# Patient Record
Sex: FEMALE | Race: WHITE
[De-identification: names, ages, dates, MRNs, and addresses within clinical notes are randomized per-mention and may not be internally consistent; named-entity substitution may affect disease eponyms.]

---

## 2022-03-14 ENCOUNTER — RESULT REVIEW (OUTPATIENT)
Age: 62
End: 2022-03-14

## 2022-06-16 PROBLEM — Z00.00 ENCOUNTER FOR PREVENTIVE HEALTH EXAMINATION: Status: ACTIVE | Noted: 2022-06-16

## 2022-06-27 ENCOUNTER — APPOINTMENT (OUTPATIENT)
Dept: PULMONOLOGY | Facility: CLINIC | Age: 62
End: 2022-06-27
Payer: COMMERCIAL

## 2022-06-27 VITALS
OXYGEN SATURATION: 99 % | DIASTOLIC BLOOD PRESSURE: 70 MMHG | HEART RATE: 68 BPM | RESPIRATION RATE: 16 BRPM | SYSTOLIC BLOOD PRESSURE: 120 MMHG

## 2022-06-27 VITALS — BODY MASS INDEX: 25.52 KG/M2 | WEIGHT: 130 LBS | HEIGHT: 60 IN

## 2022-06-27 DIAGNOSIS — I25.10 ATHEROSCLEROTIC HEART DISEASE OF NATIVE CORONARY ARTERY W/OUT ANGINA PECTORIS: ICD-10-CM

## 2022-06-27 DIAGNOSIS — F17.200 NICOTINE DEPENDENCE, UNSPECIFIED, UNCOMPLICATED: ICD-10-CM

## 2022-06-27 DIAGNOSIS — I25.84 ATHEROSCLEROTIC HEART DISEASE OF NATIVE CORONARY ARTERY W/OUT ANGINA PECTORIS: ICD-10-CM

## 2022-06-27 DIAGNOSIS — R91.1 SOLITARY PULMONARY NODULE: ICD-10-CM

## 2022-06-27 PROCEDURE — 99204 OFFICE O/P NEW MOD 45 MIN: CPT

## 2022-06-27 RX ORDER — LOSARTAN POTASSIUM 50 MG/1
50 TABLET, FILM COATED ORAL
Qty: 30 | Refills: 0 | Status: ACTIVE | COMMUNITY
Start: 2022-05-24

## 2022-06-27 RX ORDER — ROSUVASTATIN CALCIUM 20 MG/1
20 TABLET, FILM COATED ORAL
Qty: 30 | Refills: 0 | Status: ACTIVE | COMMUNITY
Start: 2022-05-24

## 2022-06-27 RX ORDER — FLUOXETINE HYDROCHLORIDE 20 MG/1
20 CAPSULE ORAL
Qty: 90 | Refills: 0 | Status: ACTIVE | COMMUNITY
Start: 2021-10-23

## 2022-06-27 RX ORDER — CLOBETASOL PROPIONATE 0.5 MG/G
0.05 CREAM TOPICAL
Qty: 30 | Refills: 0 | Status: ACTIVE | COMMUNITY
Start: 2022-06-21

## 2022-06-27 RX ORDER — TRIAMCINOLONE ACETONIDE 1 MG/G
0.1 CREAM TOPICAL
Qty: 454 | Refills: 0 | Status: ACTIVE | COMMUNITY
Start: 2022-01-28

## 2022-06-27 RX ORDER — FLUOCINONIDE 0.5 MG/ML
0.05 SOLUTION TOPICAL
Qty: 60 | Refills: 0 | Status: ACTIVE | COMMUNITY
Start: 2022-02-11

## 2022-06-27 RX ORDER — BETAMETHASONE DIPROPIONATE 0.5 MG/G
0.05 CREAM TOPICAL
Qty: 45 | Refills: 0 | Status: ACTIVE | COMMUNITY
Start: 2022-02-11

## 2022-06-27 RX ORDER — HALOBETASOL PROPIONATE 0.5 MG/G
0.05 CREAM TOPICAL
Qty: 50 | Refills: 0 | Status: ACTIVE | COMMUNITY
Start: 2022-06-17

## 2022-06-27 RX ORDER — PERMETHRIN 50 MG/G
5 CREAM TOPICAL
Qty: 60 | Refills: 0 | Status: ACTIVE | COMMUNITY
Start: 2022-01-13

## 2022-06-27 RX ORDER — ROSUVASTATIN CALCIUM 40 MG/1
40 TABLET, FILM COATED ORAL
Qty: 30 | Refills: 0 | Status: ACTIVE | COMMUNITY
Start: 2021-10-23

## 2022-06-27 RX ORDER — PREDNISONE 10 MG/1
10 TABLET ORAL
Qty: 21 | Refills: 0 | Status: ACTIVE | COMMUNITY
Start: 2022-03-08
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
## 2022-06-27 NOTE — CONSULT LETTER
[Dear  ___] : Dear  [unfilled], [FreeTextEntry1] : I had the pleasure of evaluating your patient, Jean Fothergill , in the office today.  Please review my consultation and evaluation report that follows below.  Please do not hesitate to call me if further information is necessary or if you wish to discuss ongoing care or diagnostic work-up.   \par I very much appreciate your referral and it is a privilege to be able to provide care for your patient.\par \par Sincerely,\par  \par Trevor Borrego MD, MHCM, FACP, RHETT-C\par Pulmonary Medicine\par  of Medicine\par [facility] [facility] [employer] [employer] at \[facility] [place] [facility]\""/[facility]\par [email or web address]\par \par [facility] [place]\par [address]\[place]  [address]\par    Fax \par \par [facility] [place]\par [address]\par [place], [place] \par \par
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
## 2022-07-25 ENCOUNTER — APPOINTMENT (OUTPATIENT)
Dept: PULMONOLOGY | Facility: CLINIC | Age: 62
End: 2022-07-25

## 2023-12-27 ENCOUNTER — APPOINTMENT (OUTPATIENT)
Dept: ORTHOPEDIC SURGERY | Facility: CLINIC | Age: 63
End: 2023-12-27
Payer: COMMERCIAL

## 2023-12-27 VITALS — HEIGHT: 60 IN | WEIGHT: 130 LBS | BODY MASS INDEX: 25.52 KG/M2

## 2023-12-27 DIAGNOSIS — I10 ESSENTIAL (PRIMARY) HYPERTENSION: ICD-10-CM

## 2023-12-27 DIAGNOSIS — M79.18 MYALGIA, OTHER SITE: ICD-10-CM

## 2023-12-27 PROCEDURE — 29105 APPLICATION LONG ARM SPLINT: CPT

## 2023-12-27 PROCEDURE — 99204 OFFICE O/P NEW MOD 45 MIN: CPT | Mod: 25

## 2023-12-27 RX ORDER — NAPROXEN 500 MG/1
500 TABLET ORAL TWICE DAILY
Qty: 60 | Refills: 0 | Status: ACTIVE | COMMUNITY
Start: 2023-12-27 | End: 1900-01-01

## 2023-12-27 NOTE — IMAGING
[de-identified] :  Constitutional:  The patient appears well developed, well nourished.   Skin: No impressive skin lesions present, except as noted in detailed exam.  Lymphatic: No palpable lymphadenopathy in examined body areas.  Neurologic:  Alert and oriented to time, place and person.    Vascular: Capillary refill is normal  LEFT ELBOW Inspection: mod Swelling, ecchympsis Palpation: Tenderness over olecranon Range of Motion: limited due to pain Strength: pain with testing Neurological testing: motor exam 5/5 and light touch intact.

## 2023-12-27 NOTE — DATA REVIEWED
[Outside X-rays] : outside x-rays [Left] : left [Elbow] : elbow [I independently reviewed and interpreted images and report] : I independently reviewed and interpreted images and report

## 2023-12-27 NOTE — ASSESSMENT
[FreeTextEntry1] : Imaging was reviewed and independently interpreted Left X-Ray ELBOW (3 views): displaced fx olecranon     - The patient was advised of the diagnosis.  The natural history of the pathology was explained to the patient in layman's terms.  Several different treatment options were discussed and explained including the risks and benefits of both surgical and non-surgical treatments.  All questions and concerns were answered. - The patient was advised to modify their activities. - The patient was advised to apply ice (wrapped in a towel or protective covering) to the area daily (20 minutes at a time, 2-4X/day). - naprosyn rx - LAS , NWB - Patient was given a prescription for an anti-inflammatory medication.  They will take it for the next week and then on an as needed basis, as long as there are no medical contra-indications.  Patient is counseled on possible GI, renal, and cardiovascular side effects. - discussed r/b/a to ORIF surgery - follow up with elbow surgeon further discuss ORIF

## 2023-12-27 NOTE — HISTORY OF PRESENT ILLNESS
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 Has tried Tylenol , activtiy, splint,

## 2024-01-03 ENCOUNTER — APPOINTMENT (OUTPATIENT)
Dept: ORTHOPEDIC SURGERY | Facility: CLINIC | Age: 64
End: 2024-01-03
Payer: SELF-PAY

## 2024-01-03 ENCOUNTER — APPOINTMENT (OUTPATIENT)
Dept: ORTHOPEDIC SURGERY | Facility: CLINIC | Age: 64
End: 2024-01-03
Payer: COMMERCIAL

## 2024-01-03 VITALS — HEIGHT: 60 IN | WEIGHT: 130 LBS | BODY MASS INDEX: 25.52 KG/M2

## 2024-01-03 DIAGNOSIS — S42.402A UNSPECIFIED FRACTURE OF LOWER END OF LEFT HUMERUS, INITIAL ENCOUNTER FOR CLOSED FRACTURE: ICD-10-CM

## 2024-01-03 PROCEDURE — 99214 OFFICE O/P EST MOD 30 MIN: CPT | Mod: 25

## 2024-01-03 PROCEDURE — 73080 X-RAY EXAM OF ELBOW: CPT | Mod: LT

## 2024-01-03 PROCEDURE — 29105 APPLICATION LONG ARM SPLINT: CPT

## 2024-01-03 PROCEDURE — A4565: CPT | Mod: LT

## 2024-01-03 NOTE — HISTORY OF PRESENT ILLNESS
[Dull/Aching] : dull/aching [Radiating] : radiating [Constant] : constant [de-identified] : 1/3/24:  Pt fell and injured left elbow on 12/25/23.  Pt went to  and presents in long arm splint. [] : no [FreeTextEntry1] : left elbow

## 2024-01-03 NOTE — IMAGING
[de-identified] :  Constitutional:  The patient appears well developed, well nourished.   Skin: No impressive skin lesions present, except as noted in detailed exam.  Lymphatic: No palpable lymphadenopathy in examined body areas.  Neurologic:  Alert and oriented to time, place and person.    Vascular: Capillary refill is normal  LEFT ELBOW Inspection: mod Swelling, ecchympsis Palpation: Tenderness over olecranon Range of Motion: limited due to pain Strength: pain with testing Neurological testing: motor exam 5/5 and light touch intact.    [Left] : left elbow [FreeTextEntry1] : displaced olecranon fracture

## 2024-01-03 NOTE — ASSESSMENT
[FreeTextEntry1] : The patient was advised of the diagnosis. The natural history of the pathology was explained in full to the patient in layman's terms. All questions were answered. The risks and benefits of surgical and non-surgical treatment alternatives were explained in full to the patient.  A splint was applied.  The importance of ice and elevation were discussed with the patient.  The risks were also discussed such as compartment syndrome and skin breakdown.  They were instructed to never put foreign objects down the splint.  Patients should call for increasing pain, worsening swelling, numbness, extremity discoloration, or any other concerns.  Risks including but not limited to infection, blood loss, tendon damage and delayed tendon disruption, muscle damage, nerve damage, stiffness, pain, arthritis, loss of function, potential for secondary surgery, loss of limb or life. The patient had the opportunity to ask questions and all questions were answered to their satisfaction.

## 2024-01-11 RX ORDER — OXYCODONE AND ACETAMINOPHEN 5; 325 MG/1; MG/1
5-325 TABLET ORAL EVERY 4 HOURS
Qty: 30 | Refills: 0 | Status: ACTIVE | COMMUNITY
Start: 2024-01-11 | End: 1900-01-01

## 2024-01-12 ENCOUNTER — APPOINTMENT (OUTPATIENT)
Age: 64
End: 2024-01-12
Payer: COMMERCIAL

## 2024-01-12 PROCEDURE — 24685 OPTX ULNAR FX PROX END W/FIX: CPT | Mod: LT

## 2024-01-12 PROCEDURE — 24685 OPTX ULNAR FX PROX END W/FIX: CPT | Mod: AS,LT

## 2024-01-12 PROCEDURE — 29105 APPLICATION LONG ARM SPLINT: CPT | Mod: 59,LT

## 2024-01-22 ENCOUNTER — APPOINTMENT (OUTPATIENT)
Dept: ORTHOPEDIC SURGERY | Facility: CLINIC | Age: 64
End: 2024-01-22
Payer: COMMERCIAL

## 2024-01-22 VITALS — WEIGHT: 130 LBS | HEIGHT: 60 IN | BODY MASS INDEX: 25.52 KG/M2

## 2024-01-22 PROCEDURE — 73080 X-RAY EXAM OF ELBOW: CPT | Mod: LT

## 2024-01-22 PROCEDURE — 99024 POSTOP FOLLOW-UP VISIT: CPT

## 2024-01-22 NOTE — PHYSICAL EXAM
[de-identified] : L elbow incision C/D/I No infection Min tender mod stiffness  Xrays well aligned fracture; hardware in place

## 2024-01-22 NOTE — ASSESSMENT
[FreeTextEntry1] : Sutures removed Start therapy No lifting Follow up with Mitwendyg in 4 weeks- xrays

## 2024-01-22 NOTE — HISTORY OF PRESENT ILLNESS
[6] : 6 [Dull/Aching] : dull/aching [] : Post Surgical Visit: yes [de-identified] : L elbow olecranon ORIF  She is doing well [FreeTextEntry1] : L elbow

## 2024-02-14 ENCOUNTER — APPOINTMENT (OUTPATIENT)
Dept: ORTHOPEDIC SURGERY | Facility: CLINIC | Age: 64
End: 2024-02-14

## 2024-02-21 ENCOUNTER — APPOINTMENT (OUTPATIENT)
Dept: ORTHOPEDIC SURGERY | Facility: CLINIC | Age: 64
End: 2024-02-21
Payer: COMMERCIAL

## 2024-02-21 VITALS — BODY MASS INDEX: 25.52 KG/M2 | WEIGHT: 130 LBS | HEIGHT: 60 IN

## 2024-02-21 DIAGNOSIS — S52.022A DISPLACED FRACTURE OF OLECRANON PROCESS W/OUT INTRAARTICULAR EXTENSION OF LEFT ULNA, INITIAL ENCOUNTER FOR CLOSED FRACTURE: ICD-10-CM

## 2024-02-21 PROCEDURE — 99024 POSTOP FOLLOW-UP VISIT: CPT

## 2024-02-21 PROCEDURE — 73080 X-RAY EXAM OF ELBOW: CPT | Mod: LT

## 2024-02-21 NOTE — HISTORY OF PRESENT ILLNESS
[Dull/Aching] : dull/aching [Radiating] : radiating [Constant] : constant [de-identified] : 2/21/2024: Pt here 6 weeks s/p left olecranon ORIF. Pt has been attending formal PT for ROM and reports no pain.   1/3/24:  Pt fell and injured left elbow on 12/25/23.  Pt went to  and presents in long arm splint.   [] : no [FreeTextEntry1] : left elbow

## 2024-02-21 NOTE — IMAGING
[Left] : left elbow [de-identified] : Constitutional:  The patient appears well developed, well nourished.   Skin: well healed wound w/o evidence of infection Lymphatic: No palpable lymphadenopathy in examined body areas.  Neurologic:  Alert and oriented to time, place and person.    Vascular: Capillary refill is normal  LEFT ELBOW Inspection: minimal swelling, no ecchymosis Palpation: minimal tenderness over olecranon Range of Motion: 5-140 deg supination/pronation: normal Strength: 5/5 in all planes. Neurological testing: motor exam 5/5 and light touch intact.    [FreeTextEntry1] : displaced olecranon fracture

## 2024-02-21 NOTE — ASSESSMENT
[FreeTextEntry1] : pt doing well s/p left olecranon ORIF. Fracture is healed clinically and radiographically.  Pt will rto prn.